# Patient Record
Sex: FEMALE | Race: BLACK OR AFRICAN AMERICAN
[De-identification: names, ages, dates, MRNs, and addresses within clinical notes are randomized per-mention and may not be internally consistent; named-entity substitution may affect disease eponyms.]

---

## 2019-09-16 PROBLEM — Z00.00 ENCOUNTER FOR PREVENTIVE HEALTH EXAMINATION: Status: ACTIVE | Noted: 2019-09-16

## 2019-11-19 ENCOUNTER — APPOINTMENT (OUTPATIENT)
Dept: OTOLARYNGOLOGY | Facility: CLINIC | Age: 59
End: 2019-11-19
Payer: COMMERCIAL

## 2019-11-19 VITALS
HEIGHT: 67 IN | SYSTOLIC BLOOD PRESSURE: 152 MMHG | BODY MASS INDEX: 32.33 KG/M2 | WEIGHT: 206 LBS | DIASTOLIC BLOOD PRESSURE: 84 MMHG

## 2019-11-19 DIAGNOSIS — Z86.39 PERSONAL HISTORY OF OTHER ENDOCRINE, NUTRITIONAL AND METABOLIC DISEASE: ICD-10-CM

## 2019-11-19 DIAGNOSIS — F17.200 NICOTINE DEPENDENCE, UNSPECIFIED, UNCOMPLICATED: ICD-10-CM

## 2019-11-19 DIAGNOSIS — D89.89 OTHER SPECIFIED DISORDERS INVOLVING THE IMMUNE MECHANISM, NOT ELSEWHERE CLASSIFIED: ICD-10-CM

## 2019-11-19 DIAGNOSIS — Z78.9 OTHER SPECIFIED HEALTH STATUS: ICD-10-CM

## 2019-11-19 DIAGNOSIS — K14.8 OTHER DISEASES OF TONGUE: ICD-10-CM

## 2019-11-19 PROCEDURE — 99204 OFFICE O/P NEW MOD 45 MIN: CPT | Mod: 25

## 2019-11-19 PROCEDURE — 31575 DIAGNOSTIC LARYNGOSCOPY: CPT

## 2019-11-19 RX ORDER — AZATHIOPRINE 100 MG/1
TABLET ORAL
Refills: 0 | Status: ACTIVE | COMMUNITY

## 2019-11-19 NOTE — PHYSICAL EXAM
[Laryngoscopy Performed] : laryngoscopy was performed, see procedure section for findings [FreeTextEntry1] : There is an approx. 2-3 mm leukoplakic lesion along the R. dorsolateral tongue, no significant thickness, no TTP, no bleeding - hyperkeratotic lesion.  No other lesions in the OC/OPx. [Normal] : no rashes

## 2019-11-19 NOTE — CONSULT LETTER
[Dear  ___] : Dear  [unfilled], [Consult Letter:] : I had the pleasure of evaluating your patient, [unfilled]. [Please see my note below.] : Please see my note below. [Consult Closing:] : Thank you very much for allowing me to participate in the care of this patient.  If you have any questions, please do not hesitate to contact me. [Sincerely,] : Sincerely, [FreeTextEntry2] : Dr. Kolby Felton\par [address]\[place] [id number] [FreeTextEntry3] : Dev

## 2019-11-19 NOTE — HISTORY OF PRESENT ILLNESS
[de-identified] : 59 year old female referred by  PCP for longue lesion. Pt states lesion has been on tongue for over one year. no biopsy or scans done. Denies pain on occasion bites tongue.  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 Pt smokes 5 cigarettes per day for last 30 years.   She denies any dyspnea, dysphagia, otalgia, weight loss.

## 2019-11-19 NOTE — REVIEW OF SYSTEMS
[Nasal Congestion] : nasal congestion [Seasonal Allergies] : seasonal allergies [Problem Snoring] : problem snoring [As Noted in HPI] : as noted in HPI [Negative] : Psychiatric

## 2019-11-19 NOTE — PROCEDURE
[Gag Reflex] : gag reflex preventing mirror examination [None] : none [Serial Number: ___] : Serial Number: [unfilled] [Flexible Endoscope] : examined with the flexible endoscope [de-identified] : No lesions in the NPx, OPx, HPx or larynx.  VC are mobile, airway patent.\par

## 2019-12-10 ENCOUNTER — APPOINTMENT (OUTPATIENT)
Dept: OTOLARYNGOLOGY | Facility: CLINIC | Age: 59
End: 2019-12-10

## 2020-03-02 ENCOUNTER — APPOINTMENT (OUTPATIENT)
Dept: OTOLARYNGOLOGY | Facility: CLINIC | Age: 60
End: 2020-03-02

## 2021-10-06 ENCOUNTER — RESULT REVIEW (OUTPATIENT)
Age: 61
End: 2021-10-06